# Patient Record
Sex: MALE | Race: WHITE | URBAN - METROPOLITAN AREA
[De-identification: names, ages, dates, MRNs, and addresses within clinical notes are randomized per-mention and may not be internally consistent; named-entity substitution may affect disease eponyms.]

---

## 2024-09-23 ENCOUNTER — APPOINTMENT (OUTPATIENT)
Dept: SURGERY | Facility: CLINIC | Age: 64
End: 2024-09-23
Payer: COMMERCIAL

## 2024-09-23 DIAGNOSIS — K40.90 NON-RECURRENT UNILATERAL INGUINAL HERNIA WITHOUT OBSTRUCTION OR GANGRENE: ICD-10-CM

## 2024-09-23 PROCEDURE — 99203 OFFICE O/P NEW LOW 30 MIN: CPT | Performed by: PHYSICIAN ASSISTANT

## 2024-09-23 RX ORDER — AMLODIPINE BESYLATE 5 MG/1
5 TABLET ORAL
COMMUNITY
Start: 2024-07-15

## 2024-09-23 NOTE — PROGRESS NOTES
Subjective   Patient ID: Gagan Huang is a 64 y.o. male who presents for Hernia.    HPI  64-year-old male comes in with complaint of right inguinal hernia he states it has been there for a year or so it is getting bigger he has been wearing a hernia belt.  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  He has a previous history of an umbilical hernia repair with mesh 6 years ago with Dr. Gannon.  He states the right inguinal area bulge is getting larger and causing him discomfort.  He would like to discuss repair for sometime early next year.    Review of Systems  Review of systems is negative other than what is mentioned above      Physical Exam  Eyes: Conjunctiva non -icteric and eye lids are without obvious rash or drooping. Pupils are symmetric.   Ears, Nose, Mouth, and Throat: External ears and nose appear to be without deformity or rash. No lesions or masses noted. Hearing is grossly intact.   Neck:. No JVD noted, tracheal position is midline. No thyromegaly, no thyroid nodules  Head and Face: Examination of the head and face revealed no abnormalities.   Respiratory: No gasping or shortness of breath noted, no use of accessory muscles noted.  Lungs are clear to auscultate  Cardiovascular: Examination for edema is normal, regular rate and rhythm S1-S2  GI: Abdomen non tender to palpation, bowel sounds are present  Inguinal:.  Patient has a right inguinal hernia present on exam that is semireducible nothing on the left both testes down  Skin: No rashes or open lesions/ulcers identified on skin.   Musk: Digits/nails show no clubbing or cyanosis. No asymmetry or masses noted of the musculature. Examination of the muscles/joints/bones show normal range of motion. Gait is grossly normally.   Neurologic: Cranial nerves II- XII intact, motor strength 5/5 muscle strength of the lower extremities bilaterally and equal.    Objective     Encounter Diagnosis   Name Primary?     Non-recurrent unilateral inguinal hernia without obstruction or gangrene       There is no problem list on file for this patient.     No Known Allergies   Medication Documentation Review Audit    **Prior to Admission medications have not yet been reviewed**         Past Medical History:   Diagnosis Date    Personal history of other diseases of the circulatory system 02/24/2014    History of hypertension    Personal history of other infectious and parasitic diseases 02/24/2014    History of staph infection     Social History     Tobacco Use   Smoking Status Not on file   Smokeless Tobacco Not on file     No family history on file.   Past Surgical History:   Procedure Laterality Date    KNEE SURGERY  02/24/2014    Knee Surgery    SHOULDER SURGERY  02/24/2014    Shoulder Surgery       Assessment/Plan   Today we had a discussion about robotic assisted inguinal hernia repair with mesh.  Patient was informed that this is an outpatient surgery.  The surgery takes 1 to 1-1/2 hours.  There will be 3 small incisions or possible opened ,  requires a general anesthesia.  Patient will need a ride to and from the hospital.  Risk and benefits such as bleeding and infection were discussed as well.  Surgeries were described in detail.  Patient had complete understanding.  All questions were answered.  Patient would like to proceed.       Encounter Diagnosis   Name Primary?    Non-recurrent unilateral inguinal hernia without obstruction or gangrene      I have reviewed all data including labs,radiologic and previous reports.   **Portions of this medical record have been created using voice recognition software and may have minor errors which we are inherent in voice recognition systems it has not been fully edited for typographical or grammatical errors**      Anna Toney PA-C

## 2025-01-20 ENCOUNTER — APPOINTMENT (OUTPATIENT)
Dept: SURGERY | Facility: CLINIC | Age: 65
End: 2025-01-20
Payer: COMMERCIAL

## 2025-01-20 VITALS — OXYGEN SATURATION: 96 % | SYSTOLIC BLOOD PRESSURE: 140 MMHG | HEART RATE: 62 BPM | DIASTOLIC BLOOD PRESSURE: 84 MMHG

## 2025-01-20 DIAGNOSIS — K40.90 NON-RECURRENT UNILATERAL INGUINAL HERNIA WITHOUT OBSTRUCTION OR GANGRENE: Primary | ICD-10-CM

## 2025-01-20 PROCEDURE — 1036F TOBACCO NON-USER: CPT | Performed by: SURGERY

## 2025-01-20 PROCEDURE — 99213 OFFICE O/P EST LOW 20 MIN: CPT | Performed by: SURGERY

## 2025-01-20 RX ORDER — AMLODIPINE BESYLATE 2.5 MG/1
2.5 TABLET ORAL
COMMUNITY
Start: 2025-01-13

## 2025-01-20 NOTE — PROGRESS NOTES
Subjective   Patient ID: Gagan Huang is a 64 y.o. male who presents for Hernia.  Complaints and the large lump in the right inguinal area.  Patient delayed repair because of family issues.  Now patient presented for reassessment and scheduling of repair    HPI patient has a right inguinal hernia for more than a year.  Previously patient had umbilical hernia repair open.  Review of Systems recommended consistent with a tender palpable reducible large lump in the right inguinal area.  Review of all other 10 system is negative  Physical Exam pupils equal bilaterally, mucosa moist, bilateral breath sounds, regular rate, abdomen soft, nontender, palpable large tender reducible right inguinal hernia.  No other hernias.  No focal neurological motor deficits.  Musculoskeletal exam within normal limits, ENT exam within normal limits.    Objective I reviewed all available data including lab results, radiological studies, previous reports and notes.    No diagnosis found.   There is no problem list on file for this patient.     Allergies   Allergen Reactions    Penicillins Hives and Swelling      Medication Documentation Review Audit       Reviewed by Abisai Gannon MD (Physician) on 01/20/25 at 0939      Medication Order Taking? Sig Documenting Provider Last Dose Status   amLODIPine (Norvasc) 2.5 mg tablet [id number] Yes 1 tablet (2.5 mg). Historical Provider, MD  Active     Discontinued 01/20/25 0931                    Past Medical History:   Diagnosis Date    Personal history of other diseases of the circulatory system 02/24/2014    History of hypertension    Personal history of other infectious and parasitic diseases 02/24/2014    History of staph infection     Social History     Tobacco Use   Smoking Status Never   Smokeless Tobacco Never     No family history on file.   Past Surgical History:   Procedure Laterality Date    KNEE SURGERY  02/24/2014    Knee Surgery    SHOULDER SURGERY  02/24/2014    Shoulder Surgery        Assessment/Plan   She was symptomatic right renal hernia.  The patient has negation for robotic, possible open symptomatic right inguinal hernia repair.  Risks, benefits, alternative treatment were explained to the patient.  All questions were answered.  Informed consent was obtained.      Abisai Gannon MD

## 2025-05-12 ENCOUNTER — APPOINTMENT (OUTPATIENT)
Dept: SURGERY | Facility: CLINIC | Age: 65
End: 2025-05-12
Payer: COMMERCIAL

## 2025-05-12 DIAGNOSIS — K40.90 NON-RECURRENT UNILATERAL INGUINAL HERNIA WITHOUT OBSTRUCTION OR GANGRENE: Primary | ICD-10-CM

## 2025-05-12 PROCEDURE — 1036F TOBACCO NON-USER: CPT | Performed by: SURGERY

## 2025-05-12 PROCEDURE — 99024 POSTOP FOLLOW-UP VISIT: CPT | Performed by: SURGERY

## 2025-05-12 NOTE — PROGRESS NOTES
Subjective   Patient ID: Gagan Huang is a 65 y.o. male who presents for Post-op ([place]).  Patient has no complaints  HPI  Review of Systems  Physical Exam incision healed by primary intention    Objective     No diagnosis found.   Problem List[1]   RX Allergies[2]   Medication Documentation Review Audit       Reviewed by Abisai Gannon MD (Physician) on 05/12/25 at 1450      Medication Order Taking? Sig Documenting Provider Last Dose Status   amLODIPine (Norvasc) 2.5 mg tablet [id number]  1 tablet (2.5 mg). Historical Provider, MD  Active                    Medical History[3]  Tobacco Use History[4]  Family History[5]   Surgical History[6]    Assessment/Plan   No evidence of surgical complications, follow-up as needed      Abisai Gannon MD          [1] There is no problem list on file for this patient.  [2]   Allergies  Allergen Reactions    Penicillins Hives and Swelling   [3]   Past Medical History:  Diagnosis Date    Personal history of other diseases of the circulatory system 02/24/2014    History of hypertension    Personal history of other infectious and parasitic diseases 02/24/2014    History of staph infection   [4]   Social History  Tobacco Use   Smoking Status Never   Smokeless Tobacco Never   [5] No family history on file.  [6]   Past Surgical History:  Procedure Laterality Date    KNEE SURGERY  02/24/2014    Knee Surgery    SHOULDER SURGERY  02/24/2014    Shoulder Surgery